# Patient Record
Sex: MALE | Race: OTHER | HISPANIC OR LATINO | URBAN - METROPOLITAN AREA
[De-identification: names, ages, dates, MRNs, and addresses within clinical notes are randomized per-mention and may not be internally consistent; named-entity substitution may affect disease eponyms.]

---

## 2021-06-08 ENCOUNTER — EMERGENCY (EMERGENCY)
Facility: HOSPITAL | Age: 61
LOS: 1 days | Discharge: HOME | End: 2021-06-08
Attending: EMERGENCY MEDICINE
Payer: SELF-PAY

## 2021-06-08 VITALS
DIASTOLIC BLOOD PRESSURE: 91 MMHG | OXYGEN SATURATION: 96 % | RESPIRATION RATE: 18 BRPM | HEART RATE: 91 BPM | WEIGHT: 175.05 LBS | SYSTOLIC BLOOD PRESSURE: 169 MMHG | HEIGHT: 70.87 IN | TEMPERATURE: 100 F

## 2021-06-08 PROCEDURE — 99283 EMERGENCY DEPT VISIT LOW MDM: CPT | Mod: 25

## 2021-06-08 PROCEDURE — 73130 X-RAY EXAM OF HAND: CPT | Mod: 26,RT

## 2021-06-08 PROCEDURE — 99283 EMERGENCY DEPT VISIT LOW MDM: CPT

## 2021-06-08 PROCEDURE — 73130 X-RAY EXAM OF HAND: CPT

## 2021-06-08 RX ORDER — IBUPROFEN 200 MG
600 TABLET ORAL ONCE
Refills: 0 | Status: COMPLETED | OUTPATIENT
Start: 2021-06-08 | End: 2021-06-08

## 2021-06-08 RX ADMIN — Medication 600 MILLIGRAM(S): at 14:19

## 2021-06-08 NOTE — ED PROVIDER NOTE - ADDITIONAL NOTES AND INSTRUCTIONS:
PT was evaluated At [facility] ED and was found to have a condition that warranted time of to rest and heal from WORK/SCHOOL.   Cesar Swartz PA-C

## 2021-06-08 NOTE — ED PROVIDER NOTE - PATIENT PORTAL LINK FT
You can access the FollowMyHealth Patient Portal offered by [facility] by registering at the following website: [email or web address]. By joining [employer]’s FollowMyHealth portal, you will also be able to view your health information using other applications (apps) compatible with our system.

## 2021-06-08 NOTE — ED PROVIDER NOTE - CARE PROVIDER_API CALL
Arnaud Alvarado)  Plastic Surgery  [address] [place] [address]  Phone: [phone]  Fax: [phone]  Follow Up Time:

## 2021-06-08 NOTE — ED PROVIDER NOTE - NSFOLLOWUPINSTRUCTIONS_ED_ALL_ED_FT
Please Uses Over the Counter:   1) Tylenol: 500-650mg every 6hours as needed   2) Ibuprofen/Motrin/Advil: 400-600mg every 6hours as needed  FOR PAIN..      Educación para el paciente: Cómo cuidar los wu y las raspaduras (Conceptos Básicos)  View in English  Redactado por los médicos y editores de [employer]  ¿Es necesario que suturen mi christen?  Si el christen no atraviesa toda la piel, no necesitará suturas (figura 1). Si el christen es radha, disparejo o atraviesa toda la piel, lo más probable es que necesite suturas. Si se corta y no sabe si necesita suturas, consulte a gutierrez médico o enfermero.    Lanie artículo trata sobre cómo cuidar de los wu y las raspaduras que no requieren suturas. Si tiene suturas, gutierrez médico o enfermero le dirá cómo cuidarlas.    ¿Qué khang hacer para cuidar la raspadura o el christen yo mismo?  Para cuidar el christen o la raspadura, siga estas pautas básicas de primeros auxilios:    ?Limpie el christen o la raspadura – Lave aldo con agua y jabón. Si tiene estrada, zia u otro objeto que no sale después del lavado, llame al médico o enfermero.    ?Detenga el sangrado – Si el christen o la raspadura está sangrando, breanne presión con firmeza en la jagjit con un paño limpio harman 20 minutos. También puede ayudar a que el sangrado sea más lento si mantiene el christen por encima del nivel del corazón. Si el sangrado no para después de 20 minutos, llame al médico o enfermero.    ?Coloque yasmeen capa delgada de crema antibiótica en el christen o la raspadura.    ?Cubra el christen o la raspadura con yasmeen venda o gasa. Mantenga la venda o gasa limpia y seca. Cambie la venda yasmeen a dos veces por día hasta que el christen o la raspadura se haya curado.    ?Esté atento a signos de que el christen o la raspadura se haya infectado.    La mayoría de los wu y las raspaduras se curan solos en el transcurso de 7 a 10 días. A medida que el christen o la raspadura se vaya curando, se formará yasmeen costra. Asegúrese de no tocarse ni arrancarse la costra.    ¿Cuándo khang llamar al médico o enfermero?  Llame al médico o enfermero si tiene algún signo de infección. Los signos de infección incluyen:    ?Fiebre    ?Enrojecimiento, inflamación, calor o aumento del dolor en el christen o la raspadura    ?Drenaje de pus del christen o de la raspadura    ?Tania foster en la piel que rodea el christen o la raspadura, o tania fostre que se extienden por el brazo o la pierna    Los wu llamados “heridas punzantes” tienen más posibilidades de infectarse. Yasmeen herida punzante es un tipo de christen que se produjo en la piel por un objeto que la atravesó y llegó al tejido interno.    ¿Voy a necesitar la vacuna del tétano?  David vez. Depende de gutierrez edad y de cuándo le aplicaron la última dosis de la vacuna. El tétano es yasmeen infección grave que puede producir rigidez muscular y espasmos, y hasta causar la muerte. Esta infección es causada por bacterias (gérmenes) que viven en la valerio.    A la mayoría de los niños se les aplica la vacuna del tétano jarvis parte de mj controles médicos de rutina. Las vacunas pueden prevenir ciertas infecciones graves o mortales. A muchos adultos también se les aplica la vacuna del tétano jarvis parte de mj controles médicos de rutina. Es importante que se aplique todas las vacunas, ya que se puede contraer el tétano incluso a partir de yasmeen raspadura o un christen pequeño.    Si tiene un christen en la piel y en especial si el christen está sucio o es profundo, pregúntele al médico o enfermero si debe aplicarse la vacuna del tétano.

## 2021-06-08 NOTE — ED ADULT NURSE NOTE - OBJECTIVE STATEMENT
Pt. presents alert and oriented x 4 to ED complaining of 6/10 R. hand pain s/p crush injury. Pt. states when he was at work this morning around 11:30 his R. hand got stuck in a conveyer belt. Pt. states his hand was stuck for ~5 minutes. Pt. reports edema and pain with movement. Full ROM although pt reports pain and stiffness. 2+ radial pulses bilaterally. No wounds or active bleeding. Pt. denies blood thinners, injury to any other extremity. Pt. ambulatory, JOANIE, in NAD. VSS.

## 2021-06-08 NOTE — ED PROVIDER NOTE - OBJECTIVE STATEMENT
60 y.o M with no PMH presenting to the ED with R hand pain. Patient states at 11:40 am this morning at work his hand got stuck in a "machine w/ a band and wheel", sounds like a conveyer belt, for 5 minutes. 60 y.o M with no PMH presenting to the ED with R hand pain. Patient states at 11:40 am this morning at work his hand got stuck in a "machine w/ a band and wheel", sounds like a conveyer belt. Patient states his hand was stuck for 5 minutes to the level of the metacarpal-phalangeal joints of 2nd-4th digits and proximal tip of R thumb. Patient reports achy pain of digits, especially in 2nd-4th R digits, 6/10 in severity. Endorses edema and pain with ROM of digits. Patient denies bleeding, abrasions, lacerations, loss of sensation, loss of motor function, cyanosis.

## 2021-06-08 NOTE — ED PROVIDER NOTE - PHYSICAL EXAMINATION
General: Well appearing male, no acute distress  Cardiovascular: Radial and ulnar pulses intact B/L, Good capillary refill B/L  Musculoskeletal: Pain w/ active flexion of metacarpal-phalangeal joints and PIP joints of 2nd-4th digits of R hand, Sensation intact B/L UE, 5/5 Motor strength with R digits & thumb ABduction & ADduction  Skin: No ecchymosis, abrasions, lacerations observed

## 2021-06-08 NOTE — ED PROVIDER NOTE - ATTENDING CONTRIBUTION TO CARE
I, Ceasar Arceo, performed the initial face to face bedside interview with this patient regarding history of present illness, review of symptoms and relevant past medical, social and family history.  I completed an independent physical examination.  I was the initial provider who evaluated this patient. I have signed out the follow up of any pending tests (i.e. labs, radiological studies) to the ACP and PA student  I have communicated the patient’s plan of care and disposition with the ACP and PA student

## 2021-06-08 NOTE — ED ADULT TRIAGE NOTE - CHIEF COMPLAINT QUOTE
Patient BIBA to ED today from work with c/o right hand injury.  Patient got his hand crushed in a machine.  Patient with no loss of sensation, + pulse, + flexion & extension of right hand.

## 2021-06-08 NOTE — ED PROVIDER NOTE - CLINICAL SUMMARY MEDICAL DECISION MAKING FREE TEXT BOX
PT with stable VS, no acute distress, non toxic appearing, tolerating PO in the ED, Pt neruo vasc intact, no acute findings on xray, ALEX, strength intact, all compartments soft, Pt with no open wounds, NO anatomical snuff box tenderness, Pt to be dc home with supportive care, OTC pain meds follow up to hand specialist, educated about when to return to the ED if needed. PT verbalizes that he understands all instructions and results. Pt informed that ED is open and available 24/7 365 days a yr, encouraged to return to the ED if they have any change in condition, or feel the need for revaluation.    utilized to obtain History, ROS, Physical Exam, explanations of results and plan of care, as well as follow up instructions.